# Patient Record
Sex: MALE | URBAN - METROPOLITAN AREA
[De-identification: names, ages, dates, MRNs, and addresses within clinical notes are randomized per-mention and may not be internally consistent; named-entity substitution may affect disease eponyms.]

---

## 2017-02-07 ENCOUNTER — TRANSFERRED RECORDS (OUTPATIENT)
Dept: HEALTH INFORMATION MANAGEMENT | Facility: CLINIC | Age: 52
End: 2017-02-07

## 2019-09-12 ENCOUNTER — DOCUMENTATION ONLY (OUTPATIENT)
Dept: CARE COORDINATION | Facility: CLINIC | Age: 54
End: 2019-09-12

## 2019-09-13 NOTE — TELEPHONE ENCOUNTER
RECORDS RECEIVED FROM: rt elbow osteoarthritis/had ulnar nerve transposition in 2016/[facility] and rec[facility] to clinic/appt sched per pt   DATE RECEIVED: Sep 30, 2019     NOTES STATUS DETAILS   OFFICE NOTE from referring provider [facility] process [facility]   OFFICE NOTE from other specialist N/A    DISCHARGE SUMMARY from hospital N/A    DISCHARGE REPORT from the ER N/A    OPERATIVE REPORT N/A    MEDICATION LIST In process [facility]   IMPLANT RECORD/STICKER N/A    LABS     CBC/DIFF N/A    CULTURES N/A    INJECTIONS DONE IN RADIOLOGY N/A    MRI N/A    CT SCAN N/A    XRAYS (IMAGES & REPORTS) Recevied 2/7/17   TUMOR     PATHOLOGY  Slides & report N/A      09/16/19   12:10 PM   Records scanned to bam Pineda CMA      09/13/19   11:27 AM   Request sent to CORA Pineda CMA

## 2019-09-30 ENCOUNTER — PRE VISIT (OUTPATIENT)
Dept: ORTHOPEDICS | Facility: CLINIC | Age: 54
End: 2019-09-30

## 2019-09-30 ENCOUNTER — OFFICE VISIT (OUTPATIENT)
Dept: ORTHOPEDICS | Facility: CLINIC | Age: 54
End: 2019-09-30
Payer: COMMERCIAL

## 2019-09-30 ENCOUNTER — ANCILLARY PROCEDURE (OUTPATIENT)
Dept: GENERAL RADIOLOGY | Facility: CLINIC | Age: 54
End: 2019-09-30
Attending: ORTHOPAEDIC SURGERY
Payer: COMMERCIAL

## 2019-09-30 DIAGNOSIS — M25.521 RIGHT ELBOW PAIN: Primary | ICD-10-CM

## 2019-09-30 DIAGNOSIS — G56.21 CUBITAL TUNNEL SYNDROME ON RIGHT: ICD-10-CM

## 2019-09-30 DIAGNOSIS — M25.521 RIGHT ELBOW PAIN: ICD-10-CM

## 2019-09-30 DIAGNOSIS — M19.021 ARTHRITIS OF ELBOW, RIGHT: Primary | ICD-10-CM

## 2019-09-30 RX ORDER — LOSARTAN POTASSIUM AND HYDROCHLOROTHIAZIDE 12.5; 5 MG/1; MG/1
1 TABLET ORAL
COMMUNITY
Start: 2018-10-16 | End: 2019-10-16

## 2019-09-30 RX ORDER — BUDESONIDE 0.5 MG/2ML
1 INHALANT ORAL
COMMUNITY

## 2019-09-30 RX ORDER — ALBUTEROL SULFATE 0.83 MG/ML
2.5 SOLUTION RESPIRATORY (INHALATION)
COMMUNITY
Start: 2008-12-05

## 2019-09-30 RX ORDER — IPRATROPIUM BROMIDE AND ALBUTEROL SULFATE 2.5; .5 MG/3ML; MG/3ML
SOLUTION RESPIRATORY (INHALATION)
Refills: 0 | COMMUNITY
Start: 2019-01-28

## 2019-09-30 RX ORDER — ALBUTEROL SULFATE 0.83 MG/ML
SOLUTION RESPIRATORY (INHALATION)
Refills: 0 | COMMUNITY
Start: 2019-01-31

## 2019-09-30 ASSESSMENT — ENCOUNTER SYMPTOMS
WHEEZING: 1
NERVOUS/ANXIOUS: 1
ALTERED TEMPERATURE REGULATION: 1
SINUS PAIN: 1
ABDOMINAL PAIN: 1
MUSCLE CRAMPS: 1
DIARRHEA: 1
MUSCLE WEAKNESS: 1
CONSTIPATION: 1
BLOATING: 1
DECREASED CONCENTRATION: 0
DYSPNEA ON EXERTION: 1
HYPERTENSION: 1
PARALYSIS: 0
NECK PAIN: 1
DIZZINESS: 0
PANIC: 0
WEIGHT GAIN: 0
SORE THROAT: 0
HOARSE VOICE: 0
COUGH DISTURBING SLEEP: 1
CHILLS: 0
NAUSEA: 1
DEPRESSION: 0
FEVER: 1
SPEECH CHANGE: 0
PALPITATIONS: 0
INSOMNIA: 1
ARTHRALGIAS: 1
HALLUCINATIONS: 0
MYALGIAS: 1
TASTE DISTURBANCE: 0
INCREASED ENERGY: 0
EXERCISE INTOLERANCE: 0
DISTURBANCES IN COORDINATION: 0
SYNCOPE: 0
HEMOPTYSIS: 0
NIGHT SWEATS: 1
VOMITING: 0
SNORES LOUDLY: 1
FATIGUE: 1
TROUBLE SWALLOWING: 0
LOSS OF CONSCIOUSNESS: 0
TINGLING: 1
POLYPHAGIA: 0
STIFFNESS: 1
WEIGHT LOSS: 0
NUMBNESS: 1
NECK MASS: 1
SINUS CONGESTION: 0
JOINT SWELLING: 0
SPUTUM PRODUCTION: 0
BACK PAIN: 0
SMELL DISTURBANCE: 0
COUGH: 1
POSTURAL DYSPNEA: 1
HYPOTENSION: 0
MEMORY LOSS: 0
HEADACHES: 1
LEG PAIN: 0
ORTHOPNEA: 1
SEIZURES: 0
HEARTBURN: 0
DECREASED APPETITE: 0
SHORTNESS OF BREATH: 1
TREMORS: 0
POLYDIPSIA: 0
LIGHT-HEADEDNESS: 1

## 2019-09-30 NOTE — PROGRESS NOTES
[facility]  Orthopedics  Reece Dao MD  2019     Name: Jayden Quiñones  MRN: [id number]  Age: 53 year old  : 1965  Referring provider: Jack Romero     Chief Complaint: No chief complaint on file.    Date of Injury:     History of Present Illness:   Jayden Quiñones is a 53 year old male who presents today for further evaluation of right elbow pain. 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 He was not evaluated at that time or diagnosed with a fracture and he just worked through ever since this time, he has had some elbow pain..  He also notices that the elbow is quite stiff.  He does not have a normal range of motion and cannot get his arm to his mouth.  More recently, the pain is gotten worse.  He also developed weakness in the hand, first dorsal interosseous atrophy, and numbness in the small and ring finger.  He was ultimately diagnosed with ulnar neuropathy.  On 2016, he had a ulnar nerve transposition performed by Dr. Negrete in [place], [place].  He reports that an EMG was not done before surgery.  He does note that the first dorsal interosseous atrophy improved substantially following surgery.  There is much more muscle there now and it is really filled in.  However, he continues to have  significant pain in the medial elbow, around the surgery site, as well as numbness and shooting pain into the small and ring finger.  The weakness he previously felt in his hand has not really gotten better.  He is currently working as a ..    Review of Systems:   A 10-point review of systems was obtained and is negative except for as noted in the HPI.     Medications:   No current outpatient medications on file.    Allergies:  Patient has no allergy information on record.     Past Medical History:  No medical history reported    Past Surgical History:  Right ulnar nerve transposition     Social History:  Presents to clinic  with his wife  PCP: Physician No Ref-Primary    Family History:  No family history on file.    Physical Examination:  General: Healthy appearing male. Affect appropriate. Normal gait. Alert and oriented to surroundings.   Right Upper Extremity:   Elbow ROM  degrees    No swelling.  Healed surgical incision.  Pain at end range as well as at rest  70 degrees of supination  80 degrees of pronation  5/5 biceps and triceps  No pain at the common extensor origin  Tender posterior lateral aspect of the olecranon and trochlea  T very muna in cubital tunnel  Very tender just anterior to medial epicondyle  Very positive Tinel's just medial to epicondyle by epicenter of tenderness  2/5 interosseus function  Minimal first interosseus atrophy  5/5 EPL  Good thumb opposition  Positive Froment's sign  Paresthesias in ulnar nerve distribution with light touch    2 point discrimination:  Ulnar: 11 mm  Median: 6 mm    Imaging:   Radiographs of the right elbow - 3 views (09/30/2019)  Progressed arthritis of the right elbow compared to prior with a large anterior and posterior bony spurs/ossicles    I have independently reviewed the above imaging studies; the results were discussed with the patient.       Assessment:   53 year old male with right elbow pain, significant elbow osteoarthritis, and ulnar neuropathy with history of ulnar nerve transposition.    Jayden appears to have 2 issues going on.  One is the neuropathy, the other is the arthritis.  As far as the neuropathy is concerned, he is already had an ulnar nerve transposition.  He reports significant improvement in his muscle atrophy following surgery  Plan:  Which leads me to believe that the nerve is no longer compressed within the cubital tunnel.  However, he has not had significant improvement in his numbness or tingling and we discussed that on occasions, the nerve is so damage before surgery that it is not able to recover.  It is difficult to say whether further  surgery would be helpful at this point.  I am not optimistic about this given his described response to his last procedure.  However, if he wishes, we can obtain an EMG for more information on the status of his nerve currently.  We also discussed gabapentin for neuropathic pain but he is not interested right now.  From an arthritis standpoint, I discussed treatment options including observation, activity modification, anti-inflammatories, arthroscopic versus open debridement and contracture release, and total elbow arthroplasty.  Given his activity demands and age, he is not a good candidate for a total elbow arthroplasty and he is not interested in this.  We discussed arthroscopic debridement.  I think in light of his poor range of motion arc and history of ulnar nerve transposition, he is a better candidate for an open release if he would like to do something about his motion. Discussed that would not impact underlying disease couress as far as we know but jerod brodyve motion/pain in the interim. .  He will think about how he would like to proceed.  He will return to see me after the EMG is done and we will get an MRI at that point.      Reece Dao MD      Scribe Disclosure:   I, Angelica Chavez, am serving as a scribe to document services personally performed by Reece Dao MD at this visit, based upon the provider's statements to me. All documentation has been reviewed by the aforementioned provider prior to being entered into the official medical record.  Answers for HPI/ROS submitted by the patient on 9/30/2019   General Symptoms: Yes  Skin Symptoms: No  HENT Symptoms: Yes  EYE SYMPTOMS: No  HEART SYMPTOMS: Yes  LUNG SYMPTOMS: Yes  INTESTINAL SYMPTOMS: Yes  URINARY SYMPTOMS: No  REPRODUCTIVE SYMPTOMS: Yes  SKELETAL SYMPTOMS: Yes  BLOOD SYMPTOMS: No  NERVOUS SYSTEM SYMPTOMS: Yes  MENTAL HEALTH SYMPTOMS: Yes  Fever: Yes  Loss of appetite: No  Weight loss: No  Weight gain: No  Fatigue: Yes  Night  sweats: Yes  Chills: No  Increased stress: No  Excessive hunger: No  Excessive thirst: No  Feeling hot or cold when others believe the temperature is normal: Yes  Loss of height: No  Post-operative complications: No  Surgical site pain: No  Hallucinations: No  Change in or Loss of Energy: No  Hyperactivity: No  Confusion: No  Ear pain: No  Ear discharge: No  Hearing loss: No  Tinnitus: Yes  Nosebleeds: No  Congestion: No  Sinus pain: Yes  Trouble swallowing: No   Voice hoarseness: No  Mouth sores: No  Sore throat: No  Tooth pain: Yes  Gum tenderness: No  Bleeding gums: No  Change in taste: No  Change in sense of smell: No  Dry mouth: No  Hearing aid used: No  Neck lump: Yes  Cough: Yes  Sputum or phlegm: No  Coughing up blood: No  Difficulty breating or shortness of breath: Yes  Snoring: Yes  Wheezing: Yes  Difficulty breathing on exertion: Yes  Nighttime Cough: Yes  Difficulty breathing when lying flat: Yes  Chest pain or pressure: No  Fast or irregular heartbeat: No  Pain in legs with walking: No  Trouble breathing while lying down: Yes  Fingers or toes appear blue: No  High blood pressure: Yes  Low blood pressure: No  Fainting: No  Murmurs: No  Pacemaker: No  Varicose veins: No  Edema or swelling: Yes  Light-headedness: Yes  Exercise intolerance: No  Heart burn or indigestion: No  Nausea: Yes  Vomiting: No  Abdominal pain: Yes  Bloating: Yes  Constipation: Yes  Diarrhea: Yes  Back pain: No  Muscle aches: Yes  Neck pain: Yes  Swollen joints: No  Joint pain: Yes  Bone pain: No  Muscle cramps: Yes  Muscle weakness: Yes  Joint stiffness: Yes  Bone fracture: No  Trouble with coordination: No  Dizziness or trouble with balance: No  Fainting or black-out spells: No  Memory loss: No  Headache: Yes  Seizures: No  Speech problems: No  Tingling: Yes  Tremor: No  Difficulty walking: No  Paralysis: No  Numbness: Yes  Scrotal pain or swelling: No  Erectile dysfunction: No  Penile discharge: No  Genital ulcers: No  Reduced  libido: No  Nervous or Anxious: Yes  Depression: No  Trouble sleeping: Yes  Trouble thinking or concentrating: No  Mood changes: No  Panic attacks: No

## 2019-09-30 NOTE — NURSING NOTE
Reason For Visit:   Chief Complaint   Patient presents with     Consult For     Right elbow osteoarthritis - hx of ulnar nerve trans in 2016       Primary MD: No Ref-Primary, Physician    ?  No    Age: 53 year old    Occupation:  at [employer].  Currently working? Yes.  Work status?  Full time.  Date of injury: 1981  Type of injury: elbow fracture, was never reduced or operated on - pt believes things started worsening since then.  Date of surgery: 2016  Type of surgery: Ulnar nerve transposition, ganglion cyst excision.  Smoker: Yes  Request smoking cessation information: No      There were no vitals taken for this visit.      Pain Assessment  Patient Currently in Pain: Yes  0-10 Pain Scale: 3               QuickDASH Assessment  No flowsheet data found.       No Known Allergies    Carine Kearney ATC

## 2019-09-30 NOTE — LETTER
2019       RE: Jayden Quiñones  [address]  [facility] [address]     Dear Colleague,    Thank you for referring your patient, Jayden Quiñones, to the [facility] at [facility]. Please see a copy of my visit note below.    [facility]  Orthopedics  Reece Dao MD  2019     Name: Jayden Quiñones  MRN: [id number]  Age: 53 year old  : 1965  Referring provider: Jack Romero     Chief Complaint: No chief complaint on file.    Date of Injury:     History of Present Illness:   Jayden Quiñones is a 53 year old male who presents today for further evaluation of right elbow pain. 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 He was not evaluated at that time or diagnosed with a fracture and he just worked through ever since this time, he has had some elbow pain..  He also notices that the elbow is quite stiff.  He does not have a normal range of motion and cannot get his arm to his mouth.  More recently, the pain is gotten worse.  He also developed weakness in the hand, first dorsal interosseous atrophy, and numbness in the small and ring finger.  He was ultimately diagnosed with ulnar neuropathy.  On 2016, he had a ulnar nerve transposition performed by Dr. Negrete in [place], [place].  He reports that an EMG was not done before surgery.  He does note that the first dorsal interosseous atrophy improved substantially following surgery.  There is much more muscle there now and it is really filled in.  However, he continues to have  significant pain in the medial elbow, around the surgery site, as well as numbness and shooting pain into the small and ring finger.  The weakness he previously felt in his hand has not really gotten better.  He is currently working as a ..    Review of Systems:   A 10-point review of systems was obtained and is negative except for as noted in the  HPI.     Medications:   No current outpatient medications on file.    Allergies:  Patient has no allergy information on record.     Past Medical History:  No medical history reported    Past Surgical History:  Right ulnar nerve transposition     Social History:  Presents to clinic with his wife  PCP: Physician No Ref-Primary    Family History:  No family history on file.    Physical Examination:  General: Healthy appearing male. Affect appropriate. Normal gait. Alert and oriented to surroundings.   Right Upper Extremity:   Elbow ROM  degrees    No swelling.  Healed surgical incision.  Pain at end range as well as at rest  70 degrees of supination  80 degrees of pronation  5/5 biceps and triceps  No pain at the common extensor origin  Tender posterior lateral aspect of the olecranon and trochlea  T very muna in cubital tunnel  Very tender just anterior to medial epicondyle  Very positive Tinel's just medial to epicondyle by epicenter of tenderness  2/5 interosseus function  Minimal first interosseus atrophy  5/5 EPL  Good thumb opposition  Positive Froment's sign  Paresthesias in ulnar nerve distribution with light touch    2 point discrimination:  Ulnar: 11 mm  Median: 6 mm    Imaging:   Radiographs of the right elbow - 3 views (09/30/2019)  Progressed arthritis of the right elbow compared to prior with a large anterior and posterior bony spurs/ossicles    I have independently reviewed the above imaging studies; the results were discussed with the patient.       Assessment:   53 year old male with right elbow pain, significant elbow osteoarthritis, and ulnar neuropathy with history of ulnar nerve transposition.    Jayden appears to have 2 issues going on.  One is the neuropathy, the other is the arthritis.  As far as the neuropathy is concerned, he is already had an ulnar nerve transposition.  He reports significant improvement in his muscle atrophy following surgery  Plan:  Which leads me to believe that  the nerve is no longer compressed within the cubital tunnel.  However, he has not had significant improvement in his numbness or tingling and we discussed that on occasions, the nerve is so damage before surgery that it is not able to recover.  It is difficult to say whether further surgery would be helpful at this point.  I am not optimistic about this given his described response to his last procedure.  However, if he wishes, we can obtain an EMG for more information on the status of his nerve currently.  We also discussed gabapentin for neuropathic pain but he is not interested right now.  From an arthritis standpoint, I discussed treatment options including observation, activity modification, anti-inflammatories, arthroscopic versus open debridement and contracture release, and total elbow arthroplasty.  Given his activity demands and age, he is not a good candidate for a total elbow arthroplasty and he is not interested in this.  We discussed arthroscopic debridement.  I think in light of his poor range of motion arc and history of ulnar nerve transposition, he is a better candidate for an open release if he would like to do something about his motion. Discussed that would not impact underlying disease couress as far as we know but jerod brodyve motion/pain in the interim. .  He will think about how he would like to proceed.  He will return to see me after the EMG is done and we will get an MRI at that point.      Reece Dao MD      Scribe Disclosure:   I, Angelica Chavez, am serving as a scribe to document services personally performed by Reece Dao MD at this visit, based upon the provider's statements to me. All documentation has been reviewed by the aforementioned provider prior to being entered into the official medical record.  Answers for HPI/ROS submitted by the patient on 9/30/2019   General Symptoms: Yes  Skin Symptoms: No  HENT Symptoms: Yes  EYE SYMPTOMS: No  HEART SYMPTOMS: Yes  LUNG  SYMPTOMS: Yes  INTESTINAL SYMPTOMS: Yes  URINARY SYMPTOMS: No  REPRODUCTIVE SYMPTOMS: Yes  SKELETAL SYMPTOMS: Yes  BLOOD SYMPTOMS: No  NERVOUS SYSTEM SYMPTOMS: Yes  MENTAL HEALTH SYMPTOMS: Yes  Fever: Yes  Loss of appetite: No  Weight loss: No  Weight gain: No  Fatigue: Yes  Night sweats: Yes  Chills: No  Increased stress: No  Excessive hunger: No  Excessive thirst: No  Feeling hot or cold when others believe the temperature is normal: Yes  Loss of height: No  Post-operative complications: No  Surgical site pain: No  Hallucinations: No  Change in or Loss of Energy: No  Hyperactivity: No  Confusion: No  Ear pain: No  Ear discharge: No  Hearing loss: No  Tinnitus: Yes  Nosebleeds: No  Congestion: No  Sinus pain: Yes  Trouble swallowing: No   Voice hoarseness: No  Mouth sores: No  Sore throat: No  Tooth pain: Yes  Gum tenderness: No  Bleeding gums: No  Change in taste: No  Change in sense of smell: No  Dry mouth: No  Hearing aid used: No  Neck lump: Yes  Cough: Yes  Sputum or phlegm: No  Coughing up blood: No  Difficulty breating or shortness of breath: Yes  Snoring: Yes  Wheezing: Yes  Difficulty breathing on exertion: Yes  Nighttime Cough: Yes  Difficulty breathing when lying flat: Yes  Chest pain or pressure: No  Fast or irregular heartbeat: No  Pain in legs with walking: No  Trouble breathing while lying down: Yes  Fingers or toes appear blue: No  High blood pressure: Yes  Low blood pressure: No  Fainting: No  Murmurs: No  Pacemaker: No  Varicose veins: No  Edema or swelling: Yes  Light-headedness: Yes  Exercise intolerance: No  Heart burn or indigestion: No  Nausea: Yes  Vomiting: No  Abdominal pain: Yes  Bloating: Yes  Constipation: Yes  Diarrhea: Yes  Back pain: No  Muscle aches: Yes  Neck pain: Yes  Swollen joints: No  Joint pain: Yes  Bone pain: No  Muscle cramps: Yes  Muscle weakness: Yes  Joint stiffness: Yes  Bone fracture: No  Trouble with coordination: No  Dizziness or trouble with balance: No  Fainting  or black-out spells: No  Memory loss: No  Headache: Yes  Seizures: No  Speech problems: No  Tingling: Yes  Tremor: No  Difficulty walking: No  Paralysis: No  Numbness: Yes  Scrotal pain or swelling: No  Erectile dysfunction: No  Penile discharge: No  Genital ulcers: No  Reduced libido: No  Nervous or Anxious: Yes  Depression: No  Trouble sleeping: Yes  Trouble thinking or concentrating: No  Mood changes: No  Panic attacks: No

## 2021-07-27 ENCOUNTER — RECORDS - HEALTHEAST (OUTPATIENT)
Dept: ADMINISTRATIVE | Facility: CLINIC | Age: 56
End: 2021-07-27